# Patient Record
Sex: FEMALE | Race: BLACK OR AFRICAN AMERICAN | NOT HISPANIC OR LATINO | Employment: OTHER | URBAN - METROPOLITAN AREA
[De-identification: names, ages, dates, MRNs, and addresses within clinical notes are randomized per-mention and may not be internally consistent; named-entity substitution may affect disease eponyms.]

---

## 2017-07-10 LAB — POC BETA-HCG (QUAL): NEGATIVE

## 2017-08-15 ENCOUNTER — HISTORICAL (OUTPATIENT)
Dept: RADIOLOGY | Facility: HOSPITAL | Age: 52
End: 2017-08-15

## 2017-12-15 ENCOUNTER — HISTORICAL (OUTPATIENT)
Dept: RADIOLOGY | Facility: HOSPITAL | Age: 52
End: 2017-12-15

## 2017-12-15 LAB — POC CREATININE: 0.9 MG/DL (ref 0.6–1.3)

## 2018-03-02 ENCOUNTER — HISTORICAL (OUTPATIENT)
Dept: SURGERY | Facility: HOSPITAL | Age: 53
End: 2018-03-02

## 2018-03-07 ENCOUNTER — HISTORICAL (OUTPATIENT)
Dept: ENDOSCOPY | Facility: HOSPITAL | Age: 53
End: 2018-03-07

## 2018-08-27 LAB — POC BETA-HCG (QUAL): NEGATIVE

## 2018-08-28 ENCOUNTER — HISTORICAL (OUTPATIENT)
Dept: LAB | Facility: HOSPITAL | Age: 53
End: 2018-08-28

## 2018-12-18 ENCOUNTER — HISTORICAL (OUTPATIENT)
Dept: RADIOLOGY | Facility: HOSPITAL | Age: 53
End: 2018-12-18

## 2018-12-18 LAB — POC CREATININE: 0.8 MG/DL (ref 0.6–1.3)

## 2019-02-08 ENCOUNTER — HISTORICAL (OUTPATIENT)
Dept: ADMINISTRATIVE | Facility: HOSPITAL | Age: 54
End: 2019-02-08

## 2019-04-11 ENCOUNTER — HISTORICAL (OUTPATIENT)
Dept: RADIOLOGY | Facility: HOSPITAL | Age: 54
End: 2019-04-11

## 2019-11-05 LAB — POC BETA-HCG (QUAL): NEGATIVE

## 2019-12-23 ENCOUNTER — HISTORICAL (OUTPATIENT)
Dept: HEPATOLOGY | Facility: HOSPITAL | Age: 54
End: 2019-12-23

## 2019-12-23 LAB — POC CREATININE: 0.7 MG/DL (ref 0.6–1.3)

## 2020-12-22 ENCOUNTER — HISTORICAL (OUTPATIENT)
Dept: RADIOLOGY | Facility: HOSPITAL | Age: 55
End: 2020-12-22

## 2021-02-03 LAB — POC BETA-HCG (QUAL): NEGATIVE

## 2021-05-10 ENCOUNTER — HISTORICAL (OUTPATIENT)
Dept: ENDOSCOPY | Facility: HOSPITAL | Age: 56
End: 2021-05-10

## 2021-05-10 LAB — CRC RECOMMENDATION EXT: NORMAL

## 2021-05-19 ENCOUNTER — HISTORICAL (OUTPATIENT)
Dept: LAB | Facility: HOSPITAL | Age: 56
End: 2021-05-19

## 2021-06-29 ENCOUNTER — HISTORICAL (OUTPATIENT)
Dept: ADMINISTRATIVE | Facility: HOSPITAL | Age: 56
End: 2021-06-29

## 2021-11-04 LAB — BCS RECOMMENDATION EXT: NORMAL

## 2022-04-06 LAB
CHLAMYDIA, NUC. ACID AMP: NEGATIVE
HUMAN PAPILLOMAVIRUS (HPV): NORMAL
NEISSERIA GONORRHOEAE (GC) CULTURE: NEGATIVE
PAP RECOMMENDATION EXT: NORMAL
PAP SMEAR: NORMAL
TRICHOMONAS VAGINALIS BY NAA: NEGATIVE

## 2022-04-11 ENCOUNTER — HISTORICAL (OUTPATIENT)
Dept: ADMINISTRATIVE | Facility: HOSPITAL | Age: 57
End: 2022-04-11

## 2022-04-29 VITALS
WEIGHT: 253.5 LBS | BODY MASS INDEX: 44.92 KG/M2 | SYSTOLIC BLOOD PRESSURE: 151 MMHG | HEIGHT: 63 IN | DIASTOLIC BLOOD PRESSURE: 85 MMHG

## 2022-04-30 NOTE — H&P
Patient:   Marbella Valiente            MRN: [id number]            FIN: [id number]               Age:   52 years     Sex:  Female     :  1965   Associated Diagnoses:   None   Author:   Renan Hyman MD      Basic Information   Source of history:  Self.       Chief Complaint   52-year-old female referred for evaluation for screening colonoscopy she has occasional constipation but denies rectal bleeding, abdominal pain, diarrhea, weight loss, melena or other alarm symptoms.  She does admit her mother had colon cancer and passed away in her 80s she believes she was diagnosed in her 70s.  Jonah a history of diabetes hypertension or cardiac or lung issues.  She had a mild shoulder surgery.  She tolerated the prep and is ready for the procedure.      Review of Systems   Constitutional:  Negative.    Eye:  Negative.    Ear/Nose/Mouth/Throat:  Negative.    Respiratory:  Negative.    Cardiovascular:  Negative.    Gastrointestinal:  Constipation.       Health Status   Allergies:    Allergic Reactions (Selected)  No Known Allergies,    Allergies (1) Active Reaction  No Known Allergies None Documented     Current medications:  (Selected)   Prescriptions  Prescribed  Flonase 50 mcg/inh nasal spray: 1 spray(s), Nasal, Daily, in each nostril, # 16 gm, 0 Refill(s)  Glucometer Test Strips and lancets: Glucometer Test Strips and lancets, See Instructions, test strips and lancets for Contour EZ machine. Use daily for CBG checks, # 100 EA, 3 Refill(s), Pharmacy: [facility]  INSULIN SYRINGES 50 UNIT 1/2': See Instructions, Use for insulin administration as prescribed, # 100 unknown unit, 3 Refill(s), eRx: [facility]  Insulin Syringes 50 unit 1/2': Insulin Syringes 50 unit 1/2', See Instructions, Use  for insulin administration as prescribed, # 100 EA, 3 Refill(s), Pharmacy: [facility]  Insulin Syringes 50 unit 1/2': Insulin Syringes 50 unit 1/2', See Instructions,  Use daily for insulin administration, # 100 EA, 3 Refill(s), Pharmacy: [facility]  Lantus 100 units/mL subcutaneous solution: 10 units, Subcutaneous, Once a day (at bedtime), Inject 10 units HS., # 10 mL, 6 Refill(s), Pharmacy: [facility]  Lasix 20 mg oral tablet: 20 mg = 1 tab(s), Oral, Daily, # 90 tab(s), 3 Refill(s), Pharmacy: [facility]  Norvasc 10 mg oral tablet: 10 mg = 1 tab(s), Oral, Daily, # 90 tab(s), 3 Refill(s), Pharmacy: [facility]  Novolin 70/30 subcutaneous injection: See Instructions, Subcutaneous. Novolin 70/30 Inject 45 units sq in am and 40 units sq in pm, # 90 mL, 3 Refill(s), Pharmacy: [facility]  Synthroid 150 mcg (0.15 mg) oral tablet: 150 mcg = 1 tab(s), Oral, Daily, # 90 tab(s), 3 Refill(s), Pharmacy: [facility]  Zyrtec 10 mg oral tablet: 10 mg = 1 tab(s), Oral, Daily, # 30 tab(s), 0 Refill(s)  labetalol 100 mg oral tablet: 100 mg = 1 tab(s), Oral, BID, # 180 tab(s), 3 Refill(s), Pharmacy: [facility]  metformin 1000 mg oral tablet: 1,000 mg = 1 tab(s), Oral, BID, # 180 tab(s), 3 Refill(s), Pharmacy: [facility]  Documented Medications  Documented  ALLOPURINOL  TAB 100M mg = 1 tab(s), Oral, Daily  AMOX/K CLAV  -125: = 1 tab(s), Oral, BID  APAP/CODEINE -60M tab(s), Oral, TID  ATORVASTATIN TAB 40MG:   CYCLOBENZAPR TAB 10MG: 10 mg = 1 tab(s), Oral, TID  GABAPENTIN   CAP 300M mg = 1 cap(s), Oral, TID  GABAPENTIN   TAB 600M mg = 1 tab(s), Oral, TID  GABAPENTIN   TAB 800M mg = 1 tab(s), Oral, TID  HYDROCO/APAP TAB 5-325MG:   ISOSORB MONO TAB 60MG ER: 60 mg = 1 tab(s), Oral, Daily  LATANOPROST  SOL 0.005%:   LEVOTHYROXIN TAB 175MC mcg = 1 tab(s), Oral, Daily  LISINOPRIL   TAB 40M mg = 1 tab(s), Oral, Daily  MELOXICAM    TAB 7.5M.5 mg = 1 tab(s), Oral, Daily  OMEPRAZOLE   CAP 20M mg = 1 cap(s), Oral, Daily  POLYETH  GLYC POW 3350 NF:   SILENOR      TAB 3MG: 3 mg = 1 tab(s), Oral, qPM  SPIRONOLACT  TAB 25M mg = 1 tab(s), Oral, Daily  SUPREP BOWEL SOL PREP KIT:   TRAZODONE    TAB 50M mg = 1 tab(s), Oral, qPM   Problem list:    All Problems  Diabetes mellitus / SNOMED CT 326221548 / Confirmed  Gout / SNOMED CT 632386110 / Confirmed  Hx pulmonary embolism(  Confirmed  ) / SNOMED CT 057363555 / Confirmed  HTN - Hypertension / SNOMED CT 8675205597 / Confirmed  Hyperlipidemia / SNOMED CT 28800793 / Confirmed  Hypothyroidism / SNOMED CT 63708881 / Confirmed  Morbid obesity / SNOMED CT 731094545 / Probable  Vitamin D deficiency / SNOMED CT 32426703 / Confirmed,    Active Problems (8)  Diabetes mellitus   Gout   HTN - Hypertension   Hx pulmonary embolism(  Confirmed  )   Hyperlipidemia   Hypothyroidism   Morbid obesity   Vitamin D deficiency         Histories   Past Medical History:    Active  Hx pulmonary embolism(  Confirmed  ) (260147979)  Resolved  HTN (401.9):  Resolved.   Family History:    Cancer - unknown origin  Brother  Stroke  Father (JERILYN HAMILTON, )  Hypertension.  Mother  Father (JERILYN HAMILTON, )  Diabetes mellitus type 1.  Mother  Father (JERILYN HAMILTON, )     Procedure history:    Shoulder joint operations (896392993) in  at 50 Years.  Comments:  2015 10:55 Crystal Trivedi RN, Josias Meade  LEFT SHOULDER  carpal tunnel release in  at 46 Years.  Thyroidectomy (86128529) in  at 45 Years.  Tubal ligation (277621721) in  at 21 Years.   Social History        Social & Psychosocial Habits    Alcohol  2014 Risk Assessment: Denies Alcohol Use    Employment/School  2015  Status: Disabled.    2015  Previous employment/school: 7TH GRADE [employer]    Exercise  2015 Risk Assessment: Regular exercise    2015  Duration (average number of minutes): 20    Times per week: 3-4 times/week    Self assessment: Fair condition    Exercise type:  Walking    Home/Environment  11/06/2015  Lives with: Spouse    Living situation: Home/Independent    Home equipment: CPAP/BiPAP, Glucose monitoring    Alcohol abuse in household: No    Substance abuse in household: No    Smoker in household: Yes    Injuries/Abuse/Neglect in household: No    Feels unsafe at home: Yes    Safe place to go: Yes    Agency(s)/Others notified: No    Family/Friends available to help: Yes    Concern for family members at home: Yes    Major illness in household: No    Financial concerns: Yes    Concerns over TV/Computer/Game use: No    Nutrition/Health  03/26/2015  Type of diet: Calorie restricted, Low Sodium, cholesterol, coumadin.    Caffeine intake amount: 2-3 12 oz cups of coffee, 1-2 12 -20 oz coke, rc or pepsi monthly. 1  8-12 oz  glassof ice tea monthly.    Vitamins/Supplements: none.    Wants to lose weight: Yes    Sleeping concerns: Yes    Feels highly stressed: Yes    11/06/2015  Type of diet: Regular    Wants to lose weight: Yes    Other    Comment: NO FALLS IN THE LAST 3 MONTHS; NO +TB SKIN TEST - 07/28/2015 10:56 - Farooq PELAEZ, Josias Meade    Sexual    Comment: Personal. - 03/26/2015 11:03 - Marci Starr RN    Substance Abuse  03/06/2015 Risk Assessment: Denies Substance Abuse    Tobacco  07/13/2013 Risk Assessment: Denies Tobacco Use    07/10/2017  Use: Never smoker  .        Physical Examination   HENT:  Normocephalic.    Respiratory:  Lungs are clear to auscultation.    Cardiovascular:  Normal rate, Regular rhythm.    Gastrointestinal:  Soft, Non-tender.       Vital Signs (last 24 hrs)_____  Last Charted___________  Temp Tympanic    36.5 DegC  (MAR 07 07:26)  Resp Rate         12 br/min  (MAR 07 07:26)  SBP      137 mmHg  (MAR 07 07:26)  DBP      73 mmHg  (MAR 07 07:26)  SpO2      99 %  (MAR 07 07:26)        Health Maintenance      Health Maintenance     Pending (in the next year)        OverDue           Diabetes Maintenance-HgbA1c due  01/13/16  and every 6  month(s)            Diabetes Maintenance-Microalbumin due  03/26/16  and every 1  year(s)           Diabetes Maintenance-Eye Exam due  04/02/16  and every 1  year(s)           Diabetes Maintenance-Urine Dipstick due  04/10/16  and every 1  year(s)           Smoking Cessation due  11/06/16  and every 1  year(s)           Influenza Vaccine due  10/02/17  and every 1  year(s)        Due            Breast Cancer Screening due  03/06/18  and every 1  year(s)           Alcohol Misuse Screening due  03/07/18  and every 1  year(s)           Colorectal Screening due  03/07/18  Variable frequency           Depression Screening due  03/07/18  and every 1  year(s)           Diabetes Maintenance-Foot Exam due  03/07/18  Variable frequency           Diabetes Maintenance-Fasting Lipid Profile due  03/07/18  Variable frequency           Hypertension Management-Education due  03/07/18  and every 1  year(s)           Lipid Screening due  03/07/18  Variable frequency           Tetanus Vaccine due  03/07/18  and every 10  year(s)        Due In Future            Body Mass Index Check not due until  07/10/18  and every 1  year(s)           Obesity Screening not due until  07/10/18  and every 1  year(s)           Hypertension Management-Blood Pressure not due until  09/07/18  and every 6  month(s)           Diabetes Maintenance-Serum Creatinine not due until  12/15/18  and every 1  year(s)           Hypertension Management-BMP not due until  12/15/18  and every 1  year(s)     Satisfied (in the past 1 year)        Satisfied            Blood Pressure Screening on  03/07/18.  Satisfied by Jatinder Sharma LPN           Body Mass Index Check on  07/10/17.  Satisfied by Josiane Davis           Breast Cancer Screening on  07/10/17.  Satisfied by Vikash Oropeza MD           Cervical Cancer Screening on  07/10/17.  Satisfied by Pura Guevara           Diabetes Maintenance-Serum Creatinine on  12/15/17.  Satisfied by Danelle Mcclendon            Hypertension Management-Blood Pressure on  03/07/18.  Satisfied by Jatinder Sharma LPN           Obesity Screening on  07/10/17.  Satisfied by Josiane Davis          Review / Management   Results review:     No qualifying data available.       Impression and Plan   Plan for screening called corrects to follow.

## 2022-05-16 ENCOUNTER — HOSPITAL ENCOUNTER (EMERGENCY)
Facility: HOSPITAL | Age: 57
Discharge: HOME | End: 2022-05-16
Attending: STUDENT IN AN ORGANIZED HEALTH CARE EDUCATION/TRAINING PROGRAM
Payer: MEDICARE

## 2022-05-16 VITALS
RESPIRATION RATE: 12 BRPM | SYSTOLIC BLOOD PRESSURE: 159 MMHG | TEMPERATURE: 98 F | HEART RATE: 89 BPM | OXYGEN SATURATION: 99 % | DIASTOLIC BLOOD PRESSURE: 91 MMHG

## 2022-05-16 DIAGNOSIS — T63.461A WASP STING, ACCIDENTAL OR UNINTENTIONAL, INITIAL ENCOUNTER: Primary | ICD-10-CM

## 2022-05-16 PROCEDURE — 99284 EMERGENCY DEPT VISIT MOD MDM: CPT

## 2022-05-16 PROCEDURE — 96372 THER/PROPH/DIAG INJ SC/IM: CPT | Performed by: PHYSICIAN ASSISTANT

## 2022-05-16 PROCEDURE — 63600175 PHARM REV CODE 636 W HCPCS: Performed by: PHYSICIAN ASSISTANT

## 2022-05-16 PROCEDURE — 25000003 PHARM REV CODE 250: Performed by: PHYSICIAN ASSISTANT

## 2022-05-16 RX ORDER — FAMOTIDINE 20 MG/1
20 TABLET, FILM COATED ORAL
Status: COMPLETED | OUTPATIENT
Start: 2022-05-16 | End: 2022-05-16

## 2022-05-16 RX ORDER — DIPHENHYDRAMINE HYDROCHLORIDE 50 MG/ML
25 INJECTION INTRAMUSCULAR; INTRAVENOUS
Status: COMPLETED | OUTPATIENT
Start: 2022-05-16 | End: 2022-05-16

## 2022-05-16 RX ADMIN — DIPHENHYDRAMINE HYDROCHLORIDE 25 MG: 50 INJECTION, SOLUTION INTRAMUSCULAR; INTRAVENOUS at 02:05

## 2022-05-16 RX ADMIN — Medication 20 MG: at 02:05

## 2022-05-16 NOTE — ED PROVIDER NOTES
Encounter Date: 5/16/2022       History     Chief Complaint   Patient presents with    Insect Bite     Stung by wasp to left upper arm an hour pta, reports pain/redness, denies SOB, able to converse in full sentences in triage     56-year-old female presents to ED for wasp sting.  Patient reports she was stung by a wasp earlier today on her left upper arm.  Patient denies chest pain, shortness of breath, throat closing, wheezing, and fever.  Patient denies taking any medications prior to arrival.         Review of patient's allergies indicates:  No Known Allergies  Past Medical History:   Diagnosis Date    Diabetes mellitus      History reviewed. No pertinent surgical history.  No family history on file.     Review of Systems   Constitutional: Negative for fever.   Respiratory: Negative for cough, chest tightness, shortness of breath, wheezing and stridor.    Cardiovascular: Negative for chest pain.   Gastrointestinal: Negative for vomiting.   Skin: Positive for rash.   Neurological: Negative for dizziness, numbness and headaches.       Physical Exam     Initial Vitals [05/16/22 1402]   BP Pulse Resp Temp SpO2   (!) 159/91 89 12 97.7 °F (36.5 °C) 99 %      MAP       --         Physical Exam    Constitutional: She appears well-developed and well-nourished.   HENT:   Head: Normocephalic and atraumatic.   Eyes: EOM are normal. Pupils are equal, round, and reactive to light.   Neck: Neck supple.   Normal range of motion.  Cardiovascular: Normal rate and regular rhythm.   Pulmonary/Chest: Breath sounds normal.   Musculoskeletal:      Cervical back: Normal range of motion and neck supple.     Neurological: She is alert and oriented to person, place, and time.   Skin: Skin is warm and dry.        Insect bite noted to in her left upper arm with mild erythema; no active drainage or fluctuance noted   Psychiatric: She has a normal mood and affect.         ED Course   Procedures  Labs Reviewed - No data to display        Imaging Results    None          Medications   diphenhydrAMINE injection 25 mg (25 mg Intramuscular Given 5/16/22 1415)   famotidine tablet 20 mg (20 mg Oral Given 5/16/22 1415)     Medical Decision Making:   Differential Diagnosis:   Insect sting   ED Management:  Patient in no acute distress. Patient non-toxic in appearance, afebrile. No respiratory distress.  Patient reports relief with Benadryl and Pepcid given in ED.  Discussed with patient symptomatic care.  Discussed with patient continuation of Benadryl and Pepcid as needed for allergic symptoms.  Discussed with patient need for follow-up with PCP.  Discussed with patient ED precautions.  Patient verbalized understanding.                         Clinical Impression:   Final diagnoses:  [T63.461A] Wasp sting, accidental or unintentional, initial encounter (Primary)          ED Disposition Condition    Discharge Stable        ED Prescriptions     None        Follow-up Information     Follow up With Specialties Details Why Contact Info    [facility]   [address] [place] [address]  [phone]             Pal Kinney PA-C  05/16/22 5517

## 2022-05-16 NOTE — FIRST PROVIDER EVALUATION
Medical screening exam completed.  I have conducted a focused provider triage encounter, findings are as follows:    Brief history of present illness:  56 year old female presents to ED for wasp sting to left upper arm prior to arrival; Denies shortness of breath and throat swelling     Vitals:    05/16/22 1402   BP: (!) 159/91   BP Location: Right arm   Patient Position: Sitting   Pulse: 89   Resp: 12   Temp: 97.7 °F (36.5 °C)   TempSrc: Oral   SpO2: 99%       Pertinent physical exam:  Awake, alert    Brief workup plan:  Benadryl     Preliminary workup initiated; this workup will be continued and followed by the physician or advanced practice provider that is assigned to the patient when roomed.

## 2022-09-22 ENCOUNTER — HISTORICAL (OUTPATIENT)
Dept: ADMINISTRATIVE | Facility: HOSPITAL | Age: 57
End: 2022-09-22
Payer: MEDICAID

## 2022-10-11 ENCOUNTER — DOCUMENTATION ONLY (OUTPATIENT)
Dept: ADMINISTRATIVE | Facility: HOSPITAL | Age: 57
End: 2022-10-11
Payer: MEDICAID

## 2024-01-06 ENCOUNTER — HOSPITAL ENCOUNTER (EMERGENCY)
Facility: HOSPITAL | Age: 59
Discharge: HOME | End: 2024-01-06
Attending: EMERGENCY MEDICINE
Payer: MEDICARE

## 2024-01-06 VITALS
DIASTOLIC BLOOD PRESSURE: 78 MMHG | SYSTOLIC BLOOD PRESSURE: 136 MMHG | BODY MASS INDEX: 42.34 KG/M2 | RESPIRATION RATE: 18 BRPM | OXYGEN SATURATION: 98 % | TEMPERATURE: 98 F | WEIGHT: 245 LBS | HEART RATE: 65 BPM

## 2024-01-06 DIAGNOSIS — M25.519 SHOULDER PAIN: ICD-10-CM

## 2024-01-06 DIAGNOSIS — M25.511 RIGHT SHOULDER PAIN, UNSPECIFIED CHRONICITY: Primary | ICD-10-CM

## 2024-01-06 LAB — TROPONIN I SERPL-MCNC: <0.01 NG/ML (ref 0–0.04)

## 2024-01-06 PROCEDURE — 99285 EMERGENCY DEPT VISIT HI MDM: CPT

## 2024-01-06 PROCEDURE — 84484 ASSAY OF TROPONIN QUANT: CPT | Performed by: NURSE PRACTITIONER

## 2024-01-06 PROCEDURE — 93010 ELECTROCARDIOGRAM REPORT: CPT | Mod: ,,, | Performed by: INTERNAL MEDICINE

## 2024-01-06 PROCEDURE — 63600175 PHARM REV CODE 636 W HCPCS: Performed by: NURSE PRACTITIONER

## 2024-01-06 PROCEDURE — 93005 ELECTROCARDIOGRAM TRACING: CPT

## 2024-01-06 PROCEDURE — 96372 THER/PROPH/DIAG INJ SC/IM: CPT | Performed by: NURSE PRACTITIONER

## 2024-01-06 RX ORDER — HYDROCODONE BITARTRATE AND ACETAMINOPHEN 5; 325 MG/1; MG/1
1 TABLET ORAL EVERY 6 HOURS PRN
Qty: 12 TABLET | Refills: 0 | Status: SHIPPED | OUTPATIENT
Start: 2024-01-06

## 2024-01-06 RX ORDER — IBUPROFEN 600 MG/1
600 TABLET ORAL EVERY 8 HOURS PRN
Qty: 15 TABLET | Refills: 0 | Status: SHIPPED | OUTPATIENT
Start: 2024-01-06

## 2024-01-06 RX ORDER — KETOROLAC TROMETHAMINE 30 MG/ML
60 INJECTION, SOLUTION INTRAMUSCULAR; INTRAVENOUS
Status: COMPLETED | OUTPATIENT
Start: 2024-01-06 | End: 2024-01-06

## 2024-01-06 RX ADMIN — KETOROLAC TROMETHAMINE 60 MG: 30 INJECTION, SOLUTION INTRAMUSCULAR at 11:01

## 2024-01-06 NOTE — ED PROVIDER NOTES
"Encounter Date: 1/6/2024       History     Chief Complaint   Patient presents with    Shoulder Pain     Right shoulder pain x 3 days. Denies trauma. No chest pain.Hesitancy on ROM  r/t pain     Patient states right shoulder pain x 3 days. Denies any injury or trauma. States that pain is intermittent and worsens with movement. States a hx. Of "arthritis in my shoulders." Hx. Of DM.    The history is provided by the patient.   Shoulder Pain  The current episode started more than 2 days ago. Episode frequency: Intermittently. The problem has not changed since onset.Pertinent negatives include no chest pain, no abdominal pain, no headaches and no shortness of breath. Exacerbated by: Movement. The symptoms are relieved by rest.     Review of patient's allergies indicates:  No Known Allergies  Past Medical History:   Diagnosis Date    Diabetes mellitus     Personal history of colonic polyps 05/10/2021     Past Surgical History:   Procedure Laterality Date    carpel tunnel release      COLONOSCOPY  05/10/2021    Renan Hyman MD    SHOULDER SURGERY       Family History   Problem Relation Age of Onset    Diabetes Father     Hypertension Father     Stroke Father     Diabetes Mother     Hypertension Mother     Colon cancer Mother      Social History     Tobacco Use    Smoking status: Never    Smokeless tobacco: Never     Review of Systems   Constitutional: Negative.    HENT: Negative.     Eyes: Negative.    Respiratory: Negative.  Negative for shortness of breath.    Cardiovascular: Negative.  Negative for chest pain.   Gastrointestinal: Negative.  Negative for abdominal pain.   Endocrine: Negative.    Genitourinary: Negative.    Musculoskeletal:  Negative for back pain and neck pain.        Shoulder Pain   Skin: Negative.    Allergic/Immunologic: Negative.    Neurological: Negative.  Negative for headaches.   Hematological: Negative.    Psychiatric/Behavioral: Negative.     All other systems reviewed and are " negative.      Physical Exam     Initial Vitals [01/06/24 1048]   BP Pulse Resp Temp SpO2   (!) 147/77 71 18 98.1 °F (36.7 °C) 98 %      MAP       --         Physical Exam    Nursing note and vitals reviewed.  Constitutional: She appears well-developed and well-nourished. No distress.   HENT:   Head: Normocephalic and atraumatic.   Mouth/Throat: Oropharynx is clear and moist.   Eyes: Conjunctivae and EOM are normal. Pupils are equal, round, and reactive to light.   Neck: Neck supple.   Normal range of motion.  Cardiovascular:  Normal rate, regular rhythm, normal heart sounds and intact distal pulses.           Pulses:       Radial pulses are 2+ on the right side and 2+ on the left side.   Pulmonary/Chest: Breath sounds normal. No respiratory distress. She has no wheezes. She exhibits no tenderness.   Abdominal: Abdomen is soft. She exhibits no distension. There is no abdominal tenderness.   Musculoskeletal:         General: No edema. Normal range of motion.      Right shoulder: Tenderness present. No swelling or deformity. Normal range of motion. Normal strength.      Left shoulder: Normal.        Arms:       Cervical back: Normal range of motion and neck supple.     Neurological: She is alert and oriented to person, place, and time. She has normal strength. GCS score is 15. GCS eye subscore is 4. GCS verbal subscore is 5. GCS motor subscore is 6.   Skin: Skin is warm and dry. No rash noted.   Psychiatric: She has a normal mood and affect. Thought content normal.         ED Course   Procedures  Labs Reviewed   TROPONIN I - Normal     EKG Readings: (Independently Interpreted)   Initial Reading: No STEMI. Rhythm: Normal Sinus Rhythm. Heart Rate: 69. Ectopy: No Ectopy. Conduction: Normal. ST Segments: Normal ST Segments. T Waves: Normal. Axis: Normal. Clinical Impression: Normal Sinus Rhythm       Imaging Results              X-Ray Shoulder Complete 2 View Right (Final result)  Result time 01/06/24 11:59:17      Final  "result by Jeffy Lowe MD (01/06/24 11:59:17)                   Impression:      No acute osseous process appreciated.      Electronically signed by: Jeffy Lowe  Date:    01/06/2024  Time:    11:59               Narrative:    EXAMINATION:  XR SHOULDER COMPLETE 2 OR MORE VIEWS RIGHT    CLINICAL HISTORY:  Pain in unspecified shoulder    TECHNIQUE:  Two or three views of the right shoulder.    COMPARISON:  Radiography 10/07/2014    FINDINGS:  No acute fracture identified.  Glenohumeral and AC joints are aligned with mild degenerative changes at the AC joint.  No defined rotator cuff calcification.                                       Medications   ketorolac injection 60 mg (60 mg Intramuscular Given 1/6/24 1130)     Medical Decision Making  Patient states right shoulder pain x 3 days. Denies any injury or trauma. States that pain is intermittent and worsens with movement. States a hx. Of "arthritis in my shoulders." Hx. Of DM.    The history is provided by the patient.   Shoulder Pain  The current episode started more than 2 days ago. Episode frequency: Intermittently. The problem has not changed since onset.Pertinent negatives include no chest pain, no abdominal pain, no headaches and no shortness of breath. Exacerbated by: Movement. The symptoms are relieved by rest.   Patient is awake, alert, neurovascularly intact, and ambulatory in the ED.     Amount and/or Complexity of Data Reviewed  Labs: ordered. Decision-making details documented in ED Course.  Radiology: ordered. Decision-making details documented in ED Course.  ECG/medicine tests: ordered. Decision-making details documented in ED Course.  Discussion of management or test interpretation with external provider(s): Differential diagnosis (including but not limited to):   Judging by the patient's chief complaint and pertinent history, the patient has the following possible differential diagnoses, including but not limited to the following.  Some of " these are deemed to be lower likelihood and some more likely based on my physical exam and history combined with possible lab work and/or imaging studies.   Please see the pertinent studies, and refer to the HPI.  Some of these diagnoses will take further evaluation to fully rule out, perhaps as an outpatient and the patient was encouraged to follow up when discharged for more comprehensive evaluation.  Fracture, Sprain, Strain, Shoulder Pain  Patient's right shoulder x-ray does not show any acute changes. Patient was given IM Toradol for pain in the ED. States that pain is relieved. Will discharge patient with pain control. ED return precautions were given.       Risk  Prescription drug management.               ED Course as of 01/06/24 1318   Sat Jan 06, 2024   1246 X-Ray Shoulder Complete 2 View Right [AB]   1248 Troponin I [AB]   1248 Troponin I: <0.010 [AB]      ED Course User Index  [AB] Masha Knight FNP                           Clinical Impression:  Final diagnoses:  [M25.519] Shoulder pain  [M25.511] Right shoulder pain, unspecified chronicity (Primary)          ED Disposition Condition    Discharge Stable          ED Prescriptions       Medication Sig Dispense Start Date End Date Auth. Provider    HYDROcodone-acetaminophen (NORCO) 5-325 mg per tablet Take 1 tablet by mouth every 6 (six) hours as needed for Pain. 12 tablet 1/6/2024 -- Masha Knight FNP    ibuprofen (ADVIL,MOTRIN) 600 MG tablet Take 1 tablet (600 mg total) by mouth every 8 (eight) hours as needed for Pain. 15 tablet 1/6/2024 -- Masha Knight FNP          Follow-up Information       Follow up With Specialties Details Why Contact Info    [facility] F[facility] In 1 week  [address] [place] [address]  [phone]               Masha Knight FNP  01/06/24 3977

## 2024-01-06 NOTE — FIRST PROVIDER EVALUATION
Medical screening examination initiated.  I have conducted a focused provider triage encounter, findings are as follows:    Brief history of present illness:  Patient states right shoulder pain x 3 days. Denies any injury or trauma.     There were no vitals filed for this visit.    Pertinent physical exam:  Awake, alert, ambulatory      Brief workup plan:  Imaging    Preliminary workup initiated; this workup will be continued and followed by the physician or advanced practice provider that is assigned to the patient when roomed.

## 2024-06-12 PROCEDURE — 87624 HPV HI-RISK TYP POOLED RSLT: CPT | Performed by: OBSTETRICS & GYNECOLOGY
